# Patient Record
Sex: FEMALE | Race: WHITE
[De-identification: names, ages, dates, MRNs, and addresses within clinical notes are randomized per-mention and may not be internally consistent; named-entity substitution may affect disease eponyms.]

---

## 2018-07-23 LAB
BASOPHILS # BLD AUTO: 0 10*3/UL (ref 0–0.1)
BASOPHILS NFR BLD AUTO: 0.5 % (ref 0–1)
DEPRECATED NEUTROPHILS # BLD AUTO: 3.7 10*3/UL (ref 2.1–6.9)
EOSINOPHIL # BLD AUTO: 0 10*3/UL (ref 0–0.4)
EOSINOPHIL NFR BLD AUTO: 0.6 % (ref 0–6)
ERYTHROCYTE [DISTWIDTH] IN CORD BLOOD: 12.2 % (ref 11.7–14.4)
HCT VFR BLD AUTO: 33.3 % (ref 34.2–44.1)
HGB BLD-MCNC: 11.6 G/DL (ref 12–16)
LYMPHOCYTES # BLD: 2.2 10*3/UL (ref 1–3.2)
LYMPHOCYTES NFR BLD AUTO: 35 % (ref 18–39.1)
MCH RBC QN AUTO: 31.3 PG (ref 28–32)
MCHC RBC AUTO-ENTMCNC: 34.8 G/DL (ref 31–35)
MCV RBC AUTO: 89.8 FL (ref 81–99)
MONOCYTES # BLD AUTO: 0.3 10*3/UL (ref 0.2–0.8)
MONOCYTES NFR BLD AUTO: 5.4 % (ref 4.4–11.3)
NEUTS SEG NFR BLD AUTO: 58.2 % (ref 38.7–80)
PLATELET # BLD AUTO: 205 X10E3/UL (ref 140–360)
RBC # BLD AUTO: 3.71 X10E6/UL (ref 3.6–5.1)

## 2018-07-31 ENCOUNTER — HOSPITAL ENCOUNTER (OUTPATIENT)
Dept: HOSPITAL 88 - OR | Age: 73
Discharge: HOME | End: 2018-07-31
Attending: INTERNAL MEDICINE
Payer: MEDICARE

## 2018-07-31 DIAGNOSIS — K29.70: ICD-10-CM

## 2018-07-31 DIAGNOSIS — K44.9: ICD-10-CM

## 2018-07-31 DIAGNOSIS — R94.6: ICD-10-CM

## 2018-07-31 DIAGNOSIS — K22.2: Primary | ICD-10-CM

## 2018-07-31 DIAGNOSIS — R82.90: ICD-10-CM

## 2018-07-31 DIAGNOSIS — R13.10: ICD-10-CM

## 2018-07-31 DIAGNOSIS — E03.9: ICD-10-CM

## 2018-07-31 DIAGNOSIS — Z01.812: ICD-10-CM

## 2018-07-31 DIAGNOSIS — R51: ICD-10-CM

## 2018-07-31 DIAGNOSIS — K21.9: ICD-10-CM

## 2018-07-31 DIAGNOSIS — K59.09: ICD-10-CM

## 2018-07-31 PROCEDURE — 43239 EGD BIOPSY SINGLE/MULTIPLE: CPT

## 2018-07-31 PROCEDURE — 93005 ELECTROCARDIOGRAM TRACING: CPT

## 2018-07-31 PROCEDURE — 43450 DILATE ESOPHAGUS 1/MULT PASS: CPT

## 2018-07-31 PROCEDURE — 85025 COMPLETE CBC W/AUTO DIFF WBC: CPT

## 2018-07-31 PROCEDURE — 43233 EGD BALLOON DIL ESOPH30 MM/>: CPT

## 2018-07-31 PROCEDURE — 36415 COLL VENOUS BLD VENIPUNCTURE: CPT

## 2018-12-11 ENCOUNTER — HOSPITAL ENCOUNTER (OUTPATIENT)
Dept: HOSPITAL 88 - CT | Age: 73
End: 2018-12-11
Attending: INTERNAL MEDICINE
Payer: MEDICARE

## 2018-12-11 DIAGNOSIS — R10.84: Primary | ICD-10-CM

## 2018-12-11 LAB
BUN SERPL-MCNC: 15 MG/DL (ref 7–26)
BUN/CREAT SERPL: 17 (ref 6–25)
EGFRCR SERPLBLD CKD-EPI 2021: > 60 ML/MIN (ref 60–?)

## 2018-12-11 PROCEDURE — 84520 ASSAY OF UREA NITROGEN: CPT

## 2018-12-11 PROCEDURE — 74177 CT ABD & PELVIS W/CONTRAST: CPT

## 2018-12-11 PROCEDURE — 36415 COLL VENOUS BLD VENIPUNCTURE: CPT

## 2018-12-11 PROCEDURE — 82948 REAGENT STRIP/BLOOD GLUCOSE: CPT

## 2018-12-11 PROCEDURE — 82565 ASSAY OF CREATININE: CPT

## 2018-12-11 NOTE — DIAGNOSTIC IMAGING REPORT
EXAMINATION: CT of the abdomen and pelvis with contrast.



TECHNIQUE: 

Spiral CT images of the abdomen and pelvis were performed from the lung bases

to the lesser trochanters after the intravenous administration of 100 cc of

Isovue 370  and the oral administration of water.  Coronal and sagittal

reformatted images were obtained.



COMPARISON:  None.



CLINICAL HISTORY:Generalized abdominal pain

     

DISCUSSION:



ABDOMEN/PELVIS:



LOWER THORAX:Lung bases are clear. Small bilateral fat-containing posterior

diaphragmatic hernias



HEPATOBILIARY: Decreased attenuation of the hepatic parenchyma compared to the

spleen, consistent with steatosis. No focal hepatic lesions.  No intra or

extrahepatic biliary ductal dilation.



GALLBLADDER: No radio-opaque stones or sludge.  No wall thickening.



SPLEEN: No splenomegaly.



PANCREAS: No focal masses or ductal dilatation. 



ADRENALS: No adrenal nodules.



KIDNEYS/URETERS: No hydronephrosis, stones, or solid mass lesions. Mild left

pelviectasis.



PELVIC ORGANS/BLADDER: Moderate to marked distention of the bladder. Mild wall

thickening in the anterior aspect (series 2, image 60). No focal lesions. No

adnexal masses.



PERITONEUM/RETROPERITONEUM: No free air or fluid.



LYMPH NODES: No intra-abdominal, retroperitoneal, pelvic or inguinal

lymphadenopathy.



VESSELS: The celiac trunk,superior and inferior mesenteric and bilateral  renal

arteries are patent  The portal, superior mesenteric and splenic veins are

patent.



GI TRACT: No bowel dilation or evidence of obstruction. No pericolonic

inflammatory changes. Mild amount of retained stool in the colon.



BONES AND SOFT TISSUE: No aggressive lytic lesions. Multilevel degenerative

disc changes in the lumbosacral spine, worse at L4-L5, with marked

intervertebral disc space narrowing, vacuum phenomenon and grade 1-2

anterolisthesis of L4 on L5 and associated dextroscoliosis.  No soft tissue

abnormalities.  



IMPRESSION: 





1. No acute abdominopelvic abnormalities.



2. Hepatic steatosis.



3. Moderate to marked distention of the bladder, with mild wall thickening. No

focal lesions are identified.  Correlate for cystitis. 



Signed by: Dr. Roosevelt Brito M.D. on 12/11/2018 11:00 AM